# Patient Record
Sex: MALE | Race: WHITE | Employment: OTHER | URBAN - METROPOLITAN AREA
[De-identification: names, ages, dates, MRNs, and addresses within clinical notes are randomized per-mention and may not be internally consistent; named-entity substitution may affect disease eponyms.]

---

## 2018-02-07 ENCOUNTER — HOSPITAL ENCOUNTER (OUTPATIENT)
Dept: PHYSICAL THERAPY | Age: 68
Discharge: HOME | End: 2018-02-28
Attending: GENERAL PRACTICE | Admitting: GENERAL PRACTICE

## 2018-02-07 ASSESSMENT — PAIN DESCRIPTION - ORIENTATION: ORIENTATION: MID;LEFT;RIGHT

## 2018-02-07 ASSESSMENT — PAIN SCALES - GENERAL: PAINLEVEL_OUTOF10: 5

## 2018-02-07 ASSESSMENT — PAIN DESCRIPTION - PROGRESSION: CLINICAL_PROGRESSION: GRADUALLY WORSENING

## 2018-02-07 ASSESSMENT — PAIN DESCRIPTION - ONSET: ONSET: ON-GOING

## 2018-02-07 ASSESSMENT — PAIN DESCRIPTION - PAIN TYPE: TYPE: CHRONIC PAIN

## 2018-02-07 ASSESSMENT — PAIN DESCRIPTION - FREQUENCY: FREQUENCY: CONTINUOUS

## 2018-02-07 ASSESSMENT — PAIN DESCRIPTION - LOCATION: LOCATION: NECK

## 2018-02-07 ASSESSMENT — PAIN DESCRIPTION - DESCRIPTORS: DESCRIPTORS: ACHING;DULL

## 2018-02-07 NOTE — PLAN OF CARE
1 day # Weeks: [] 1 week [] 5 weeks     [x] 2 days?    [] 2 weeks [x] 6 weeks     [] 3 days   [] 3 weeks [] 7 weeks     [] 4 days   [] 4 weeks [] 8 weeks         [] 9 weeks [] 10 weeks         [] 11 weeks [] 12 weeks    Rehab Potential/Progress: [] Excellent [x] Good [] Fair  [] Poor     Goals:      Long term goals  Time Frame for Long term goals : 6 Weeks  Long term goal 1: Pt will improve lateral flexion to 35° bilaterall  Long term goal 2: Pt will improve cervical rotations to 60° bilaterally  Long term goal 3: Pt will have 50% reduction in pain  Long term goal 4: Pt will have improve ability to drive  Long term goal 5: Pt will improve NDI to <25%    G-Code Selection: (On Eval and every 10th visit or Discharge)  MEASURE  [] Mobility: Walking and Moving Around     [] Current ()   [] Goal ()   [] DC ()  [x] Changing/Maintaining Body Position     [x] Current (8981)      [x] Goal ()   [] DC ()  [] Carrying / Moving / Handling Objects     [] Current ()   [] Goal ()   [] DC ()  [] Self-Care     [] Current ()   [] Goal ()   [] DC ()  [] Other PT/OT primary DX     [] Current ()   [] Goal ()   [] DC ()    SEVERITY  CURRENT  GOAL  DISCHARGE   [] CH (0% Impaired, Indep.)  [] CI (1-19% Impaired, SBA-CGA)  [] CJ (20-39% Impaired, MIN A)  [x] CK  (40-59% Impairment, Mod A)  [] CL  (60-79% Impairment, Max A)  [] CM  (80-99% Impairment, Dep.)   [] CN  (100% Impairment, Tot Dep.) [] CH (0% Impaired, Indep.)  [x] CI (1-19% Impaired, SBA-CGA)  [] CJ (20-39% Impaired, MIN A)  [] CK  (40-59% Impairment, Mod A)  [] CL  (60-79% Impairment, Max A)  [] CM  (80-99% Impairment, Dep.)   [] CN  (100% Impairment, Tot Dep.)  [] CH (0% Impaired, Indep.)  [] CI (1-19% Impaired, SBA-CGA)  [] CJ (20-39% Impaired, MIN A)  [] CK  (40-59% Impairment, Mod A)  [] CL  (60-79% Impairment, Max A)  [] CM  (80-99% Impairment, Dep.)   [] CN  (100% Impairment, Tot Dep.)

## 2018-02-07 NOTE — FLOWSHEET NOTE
Lumbar Traction  [x] Neuromuscular Re-education    [x] Cold/hotpack [] Iontophoresis   [x] Instruction in HEP      [] Vasopneumatic     [x] Manual Therapy               [] Aquatic Therapy     Manual Treatments:  Manual traction, suboccipital release, PROM in rotations and lateral flexion bilaterally- grade II side glides with lateral flexion, STM to right UT    Modalities: MHP in sitting 5 layers 10'     Communication with other providers:  poc sent to referring provider    Education provided to patient/caregiver:  Pt educated on poc, hep, pathology, if worsening symptoms to d/c that exercise. Adverse reactions to treatment:  None    Equipment provided: GTB     Assessment:  Cabrera Mann" is a 69 y.o. male reporting to OP PT with c/c of neck pain which has been occuring for years and over past few months has increased. Pt is noted to have reduced global cervical ROM, with muscular tightness in posterior muscles. Strength is WNL and there is no red flags. Pt can benefit from PT addressing deficits to help improve pain and functinal mobility. Patient agrees with established plan of care and assisted in the development of their short term and long term goals. Patient had no adverse reaction with initial treatment and there are no barriers to learning.  Demonstrates no mental or cognitive disorder.       Time In / Time Out:  1300/1400                     Timed Code/Total Treatment Minutes:  25/60; 25 Eval (1),15 Man (1), 10 TA (1)    Patients Report of Tolerance:    [] Patient limited by fatigue        [] Patient limited by pain   [] Patient limited by other medical complications   [x] Other: david tx well     Prognosis:   [x] Good [] Fair  [] Poor    Plan:   [x] Continue per plan of care [] Alter current plan (see comments)  [] Plan of care initiated [] Hold pending MD visit [] Discharge    Plan for Next Session:        Electronically signed by:  Renata Corea, PT  2/7/2018, 3:02 PM

## 2018-02-07 NOTE — PROGRESS NOTES
Physical Therapy  Initial Assessment  Date: 2018  Patient Name: Nathan Dinero  MRN: [id number]  : 1950     Treatment Diagnosis: Decreased ROM     Restrictions  Position Activity Restriction  Other position/activity restrictions: None    Subjective   General  Chart Reviewed: Yes  Patient assessed for rehabilitation services?: Yes  Family / Caregiver Present: No  Referring Practitioner: Fariha Ochoa  Referral Date : 18  Diagnosis: Neck pain  Follows Commands: Within Functional Limits  General Comment  Comments: No leg symptoms,   PT Visit Information  Onset Date: 18  PT Insurance Information: Medicare  Subjective  Subjective: Pt states he has had neck pain for years. Over last couple of months pain has increased. Having difficulty turing head and also getting headaches. Also stated having some right shoulder painalong with the neck pain. Driving makes it worse. Pain Screening  Patient Currently in Pain: Yes  Pain Assessment  Pain Assessment: 0-10  Pain Level: 5 (Max 8/10)  Pain Type: Chronic pain  Pain Location: Neck  Pain Orientation: Mid;Left;Right  Pain Radiating Towards: None  Pain Descriptors: Aching;Dull  Pain Frequency: Continuous  Pain Onset: On-going  Clinical Progression: Gradually worsening  Effect of Pain on Daily Activities: Pt having difficulty driving,   Patient's Stated Pain Goal: No pain  Pain Intervention(s): Medication (see eMar); Heat applied;Cold applied  Vital Signs  Patient Currently in Pain: Yes      Orientation  Orientation  Overall Orientation Status: Within Normal Limits    Objective     Observation/Palpation  Posture: Fair  Palpation: ttp cervical paraspinals, taught paraspinals, suboccipitals and upper traps  Scar: Midline incision from previous surgery.      AROM RLE (degrees)  RLE AROM: WNL  AROM LLE (degrees)  LLE AROM : WNL  Spine  Cervical: Flexion 40, extension 25, RLF 24, LLF 20, ROTR 30, ROTL 45    Strength RUE  R Shoulder Flexion: 5/5  R Shoulder

## 2018-02-12 ENCOUNTER — HOSPITAL ENCOUNTER (OUTPATIENT)
Dept: PHYSICAL THERAPY | Age: 68
Discharge: HOME | End: 2018-02-12
Admitting: GENERAL PRACTICE

## 2018-03-01 ENCOUNTER — HOSPITAL ENCOUNTER (OUTPATIENT)
Dept: OTHER | Age: 68
Discharge: HOME | End: 2018-03-27
Attending: GENERAL PRACTICE | Admitting: GENERAL PRACTICE

## 2020-03-19 ENCOUNTER — HOSPITAL ENCOUNTER (OUTPATIENT)
Dept: ULTRASOUND IMAGING | Age: 70
Discharge: HOME | End: 2020-03-19
Payer: MEDICARE

## 2020-03-19 PROCEDURE — 93971 EXTREMITY STUDY: CPT

## 2020-04-29 ENCOUNTER — HOSPITAL ENCOUNTER (OUTPATIENT)
Dept: ULTRASOUND IMAGING | Age: 70
Discharge: HOME | End: 2020-04-29
Payer: MEDICARE

## 2020-04-29 PROCEDURE — 93971 EXTREMITY STUDY: CPT

## 2021-01-05 ENCOUNTER — ANESTHESIA EVENT (OUTPATIENT)
Dept: OPERATING ROOM | Age: 71
End: 2021-01-05
Payer: MEDICARE

## 2021-01-05 RX ORDER — CEVIMELINE HYDROCHLORIDE 30 MG/1
30 CAPSULE ORAL 3 TIMES DAILY
COMMUNITY

## 2021-01-05 RX ORDER — METOPROLOL SUCCINATE 50 MG/1
50 TABLET, EXTENDED RELEASE ORAL DAILY
COMMUNITY

## 2021-01-05 RX ORDER — ASPIRIN 81 MG/1
81 TABLET ORAL NIGHTLY
COMMUNITY

## 2021-01-05 RX ORDER — LOVASTATIN 40 MG/1
40 TABLET ORAL NIGHTLY
COMMUNITY

## 2021-01-05 NOTE — PROGRESS NOTES
Patient states had his covid done at [address] on 12/30/2020- \"you need to call Dr Jim Gibson office and have him fax you the results to you\"- office called and they are to fax over the results

## 2021-01-06 NOTE — PROGRESS NOTES
Called to let the patient know his procedure time changed to 65 with arrival at 0930- states they called me and told me was 1045 but be there at 0945- explained to pt they have pts come one hour and 15 minutes early so needs to arriva at 0930- verbalized understanding and did inform the patient that I faxed to [facility] the patients covid results done at [facility]

## 2021-01-07 ENCOUNTER — HOSPITAL ENCOUNTER (OUTPATIENT)
Age: 71
Setting detail: OUTPATIENT SURGERY
Discharge: HOME | End: 2021-01-07
Attending: SPECIALIST | Admitting: SPECIALIST
Payer: MEDICARE

## 2021-01-07 ENCOUNTER — ANESTHESIA (OUTPATIENT)
Dept: OPERATING ROOM | Age: 71
End: 2021-01-07
Payer: MEDICARE

## 2021-01-07 VITALS
TEMPERATURE: 97.8 F | SYSTOLIC BLOOD PRESSURE: 142 MMHG | BODY MASS INDEX: 24.62 KG/M2 | DIASTOLIC BLOOD PRESSURE: 75 MMHG | RESPIRATION RATE: 16 BRPM | HEIGHT: 70 IN | OXYGEN SATURATION: 99 % | HEART RATE: 63 BPM | WEIGHT: 172 LBS

## 2021-01-07 VITALS — DIASTOLIC BLOOD PRESSURE: 59 MMHG | OXYGEN SATURATION: 100 % | SYSTOLIC BLOOD PRESSURE: 96 MMHG

## 2021-01-07 PROCEDURE — 7100000010 HC PHASE II RECOVERY - FIRST 15 MIN: Performed by: SPECIALIST

## 2021-01-07 PROCEDURE — 2709999900 HC NON-CHARGEABLE SUPPLY: Performed by: SPECIALIST

## 2021-01-07 PROCEDURE — 6360000002 HC RX W HCPCS: Performed by: NURSE ANESTHETIST, CERTIFIED REGISTERED

## 2021-01-07 PROCEDURE — 88305 TISSUE EXAM BY PATHOLOGIST: CPT

## 2021-01-07 PROCEDURE — 3700000001 HC ADD 15 MINUTES (ANESTHESIA): Performed by: SPECIALIST

## 2021-01-07 PROCEDURE — 3700000000 HC ANESTHESIA ATTENDED CARE: Performed by: SPECIALIST

## 2021-01-07 PROCEDURE — 3609010600 HC COLONOSCOPY POLYPECTOMY SNARE/COLD BIOPSY: Performed by: SPECIALIST

## 2021-01-07 PROCEDURE — 7100000011 HC PHASE II RECOVERY - ADDTL 15 MIN: Performed by: SPECIALIST

## 2021-01-07 PROCEDURE — 2580000003 HC RX 258: Performed by: SPECIALIST

## 2021-01-07 RX ORDER — LIDOCAINE HYDROCHLORIDE 20 MG/ML
INJECTION, SOLUTION INTRAVENOUS PRN
Status: DISCONTINUED | OUTPATIENT
Start: 2021-01-07 | End: 2021-01-07 | Stop reason: SDUPTHER

## 2021-01-07 RX ORDER — PROPOFOL 10 MG/ML
INJECTION, EMULSION INTRAVENOUS PRN
Status: DISCONTINUED | OUTPATIENT
Start: 2021-01-07 | End: 2021-01-07 | Stop reason: SDUPTHER

## 2021-01-07 RX ORDER — SODIUM CHLORIDE, SODIUM LACTATE, POTASSIUM CHLORIDE, CALCIUM CHLORIDE 600; 310; 30; 20 MG/100ML; MG/100ML; MG/100ML; MG/100ML
INJECTION, SOLUTION INTRAVENOUS CONTINUOUS
Status: DISCONTINUED | OUTPATIENT
Start: 2021-01-07 | End: 2021-01-07 | Stop reason: HOSPADM

## 2021-01-07 RX ADMIN — PROPOFOL 320 MG: 10 INJECTION, EMULSION INTRAVENOUS at 11:01

## 2021-01-07 RX ADMIN — SODIUM CHLORIDE, POTASSIUM CHLORIDE, SODIUM LACTATE AND CALCIUM CHLORIDE: 600; 310; 30; 20 INJECTION, SOLUTION INTRAVENOUS at 10:07

## 2021-01-07 RX ADMIN — LIDOCAINE HYDROCHLORIDE 100 MG: 20 INJECTION, SOLUTION INTRAVENOUS at 11:01

## 2021-01-07 ASSESSMENT — PAIN SCALES - GENERAL: PAINLEVEL_OUTOF10: 0

## 2021-01-07 ASSESSMENT — PAIN - FUNCTIONAL ASSESSMENT: PAIN_FUNCTIONAL_ASSESSMENT: 0-10

## 2021-01-07 ASSESSMENT — LIFESTYLE VARIABLES: SMOKING_STATUS: 0

## 2021-01-07 NOTE — PROGRESS NOTES
1136 Patient transferred from GI lab to [facility] via cart, he is awake and denies needs at this time. IV discontinued it was out on arrival from procedure. 1140 Patient is sitting up drinking a pepsi. [address] with patient's wife Edd Brewster and gave her report and home instructions, she verbalized understanding. 1220 Patient is waiting on Dr Rishabh Huang to come out and talk to him.

## 2021-01-07 NOTE — ANESTHESIA PRE PROCEDURE
Department of Anesthesiology  Preprocedure Note       Name:  Jeffry Beltre   Age:  65 y.o.  :  1950                                          MRN:  [id number]         Date:  2021      Surgeon: Bri Suarez):  Cornell Hummel MD    Procedure: Procedure(s):  COLORECTAL CANCER SCREENING, NOT HIGH RISK    Medications prior to admission:   Prior to Admission medications    Medication Sig Start Date End Date Taking? Authorizing Provider   metoprolol succinate (TOPROL XL) 50 MG extended release tablet Take 50 mg by mouth daily   Yes Historical Provider, MD   cevimeline (EVOXAC) 30 MG capsule Take 30 mg by mouth 3 times daily   Yes Historical Provider, MD   aspirin 81 MG EC tablet Take 81 mg by mouth nightly   Yes Historical Provider, MD   lovastatin (MEVACOR) 40 MG tablet Take 40 mg by mouth nightly   Yes Historical Provider, MD   rivaroxaban (XARELTO) 10 MG TABS tablet Take 5 mg by mouth daily    Historical Provider, MD   celecoxib (CELEBREX) 200 MG capsule Take 200 mg by mouth daily     Historical Provider, MD   LISINOPRIL PO Take 10 mg by mouth daily     Historical Provider, MD       Current medications:    No current facility-administered medications for this encounter.       Current Outpatient Medications   Medication Sig Dispense Refill    metoprolol succinate (TOPROL XL) 50 MG extended release tablet Take 50 mg by mouth daily      cevimeline (EVOXAC) 30 MG capsule Take 30 mg by mouth 3 times daily      aspirin 81 MG EC tablet Take 81 mg by mouth nightly      lovastatin (MEVACOR) 40 MG tablet Take 40 mg by mouth nightly      rivaroxaban (XARELTO) 10 MG TABS tablet Take 5 mg by mouth daily      celecoxib (CELEBREX) 200 MG capsule Take 200 mg by mouth daily       LISINOPRIL PO Take 10 mg by mouth daily          Allergies:  No Known Allergies    Problem List:    Patient Active Problem List   Diagnosis Code    Anemia D64.9    Hypercholesteremia E78.00       Past Medical History:        Diagnosis Date  Anemia     11/10 EGD WNL    Arthritis     \"hands, foot, shoulder , hip\"    Cancer ([address]     skin cancer removed from back x 3    Fatigue     Heart murmur     Hx of blood clots     \"had blood clot right leg March 2020- started on blood thinner then\"    Hx of colonoscopy     11/10 C-scope WNL    Hypercholesteremia     Hypertension     follow with Dr Fonseca [place] [address]    Low back pain     8/3 MRI mild stenosis L3-4, sm herniation L5-S1    Palpitations     10/13 event monitor - symptomatic PVCs and PACs; 9/13 5 beat atrial run    Screening for prostate cancer     Wears glasses        Past Surgical History:        Procedure Laterality Date    BACK SURGERY      per old chart hx of cervical discectomy 1991\"no metal\"    SKIN CANCER EXCISION  10/10, 1/14, 2020    Basal cell    TONSILLECTOMY  age 4   Codi Salines TOTAL HIP ARTHROPLASTY Left 02/12/2010    Janel       Social History:    Social History     Tobacco Use    Smoking status: Never Smoker    Smokeless tobacco: Never Used   Substance Use Topics    Alcohol use: No                                Counseling given: Not Answered      Vital Signs (Current):   Vitals:    01/05/21 0909   Weight: 172 lb (78 kg)   Height: 5' 10\" (1.778 m)                                              BP Readings from Last 3 Encounters:   07/18/13 130/72   07/19/12 124/80       NPO Status:                                                                                 BMI:   Wt Readings from Last 3 Encounters:   01/05/21 172 lb (78 kg)   01/04/21 182 lb (82.6 kg)   07/18/13 171 lb 9.6 oz (77.8 kg)     Body mass index is 24.68 kg/m².     CBC:   Lab Results   Component Value Date    WBC 5.4 07/18/2013    RBC 4.40 07/18/2013    HGB 13.1 07/18/2013    HCT 40.1 07/18/2013    MCV 91.0 07/18/2013    RDW 14.6 07/18/2013     07/18/2013       CMP:   Lab Results   Component Value Date     07/18/2013    K 5.5 07/18/2013     07/18/2013    CO2 28 07/18/2013    BUN 20 07/18/2013 CREATININE 1.0 07/18/2013    GFRAA >60 07/18/2013    GFRAA >60 07/19/2012    AGRATIO 1.6 07/18/2013    LABGLOM >60 07/18/2013    GLUCOSE 98 07/18/2013    PROT 7.0 07/18/2013    PROT 7.0 07/19/2012    CALCIUM 9.7 07/18/2013    BILITOT 0.30 07/18/2013    ALKPHOS 46 07/18/2013    AST 24 07/18/2013    ALT 18 07/18/2013       POC Tests: No results for input(s): POCGLU, POCNA, POCK, POCCL, POCBUN, POCHEMO, POCHCT in the last 72 hours. Coags: No results found for: PROTIME, INR, APTT    HCG (If Applicable): No results found for: PREGTESTUR, PREGSERUM, HCG, HCGQUANT     ABGs: No results found for: PHART, PO2ART, VNX1CMU, ZWN1XPD, BEART, W5SUTMZG     Type & Screen (If Applicable):  No results found for: LABABO, LABRH    Drug/Infectious Status (If Applicable):  No results found for: HIV, HEPCAB    COVID-19 Screening (If Applicable): No results found for: COVID19      Anesthesia Evaluation  Patient summary reviewed and Nursing notes reviewed no history of anesthetic complications:   Airway: Mallampati: II  TM distance: >3 FB   Neck ROM: full  Mouth opening: > = 3 FB Dental:          Pulmonary:       (-) not a current smoker                           Cardiovascular:    (+) hypertension:,          Beta Blocker:  Dose within 24 Hrs         Neuro/Psych:               GI/Hepatic/Renal:   (+) bowel prep,           Endo/Other:    (+) blood dyscrasia: anticoagulation therapy and anemia:., .                 Abdominal:           Vascular:   + DVT, . Anesthesia Plan      MAC     ASA 3       Induction: intravenous. Anesthetic plan and risks discussed with patient.                     Meena Liu, APRN - CRNA   1/7/2021

## 2021-01-07 NOTE — ANESTHESIA POSTPROCEDURE EVALUATION
Department of Anesthesiology  Postprocedure Note    Patient: Jerri Hoover  MRN: [id number]  YOB: 1950  Date of evaluation: 1/7/2021  Time:  11:34 AM     Procedure Summary     Date: 01/07/21 Room / Location: [address]    Anesthesia Start: 3559 Anesthesia Stop: 0482    Procedure: COLONOSCOPY POLYPECTOMY SNARE/COLD BIOPSY (N/A ) Diagnosis: (ROUTINE)    Surgeons: Camilla Enamorado MD Responsible Provider: RO Ferrera CRNA    Anesthesia Type: MAC ASA Status: 3          Anesthesia Type: MAC    Urvashi Phase I:      Urvashi Phase II:      Last vitals: Reviewed and per EMR flowsheets.        Anesthesia Post Evaluation    Patient location during evaluation: bedside  Patient participation: complete - patient participated  Level of consciousness: awake and alert  Pain score: 0  Airway patency: patent  Nausea & Vomiting: no nausea and no vomiting  Complications: no  Cardiovascular status: blood pressure returned to baseline  Respiratory status: acceptable, nonlabored ventilation, spontaneous ventilation and room air  Hydration status: euvolemic

## 2021-01-07 NOTE — BRIEF OP NOTE
BRIEF COLONOSCOPY REPORT:   Photos and full colonoscopy report available by going to \"chart review\" then \"procedures\" then  \"colonoscopy\" then \"View Endoscopy Report\"      IMPRESSION :   1) 6 mm polyp removed from the lower ascending colon with the cold snare  2) two 3 mm polyps removed from the splenic flexure with the cold forceps  3) scattered pan-diverticulosis noted  4) small internal hemorrhoids  5) otherwise normal colon    PLAN : follow up colonoscopy in 7-10 years

## 2024-11-04 ENCOUNTER — TELEPHONE (OUTPATIENT)
Dept: PULMONOLOGY | Age: 74
End: 2024-11-04

## 2024-11-13 ENCOUNTER — OFFICE VISIT (OUTPATIENT)
Dept: PULMONOLOGY | Age: 74
End: 2024-11-13
Payer: MEDICARE

## 2024-11-13 VITALS
SYSTOLIC BLOOD PRESSURE: 140 MMHG | RESPIRATION RATE: 17 BRPM | BODY MASS INDEX: 25.74 KG/M2 | WEIGHT: 179.8 LBS | DIASTOLIC BLOOD PRESSURE: 64 MMHG | OXYGEN SATURATION: 98 % | HEIGHT: 70 IN | HEART RATE: 70 BPM

## 2024-11-13 DIAGNOSIS — G47.33 OSA (OBSTRUCTIVE SLEEP APNEA): Primary | ICD-10-CM

## 2024-11-13 DIAGNOSIS — E66.3 OVERWEIGHT (BMI 25.0-29.9): ICD-10-CM

## 2024-11-13 DIAGNOSIS — G47.10 HYPERSOMNIA: ICD-10-CM

## 2024-11-13 PROCEDURE — 99204 OFFICE O/P NEW MOD 45 MIN: CPT | Performed by: INTERNAL MEDICINE

## 2024-11-13 PROCEDURE — G8419 CALC BMI OUT NRM PARAM NOF/U: HCPCS | Performed by: INTERNAL MEDICINE

## 2024-11-13 PROCEDURE — 1159F MED LIST DOCD IN RCRD: CPT | Performed by: INTERNAL MEDICINE

## 2024-11-13 PROCEDURE — 1036F TOBACCO NON-USER: CPT | Performed by: INTERNAL MEDICINE

## 2024-11-13 PROCEDURE — G8484 FLU IMMUNIZE NO ADMIN: HCPCS | Performed by: INTERNAL MEDICINE

## 2024-11-13 PROCEDURE — 1123F ACP DISCUSS/DSCN MKR DOCD: CPT | Performed by: INTERNAL MEDICINE

## 2024-11-13 PROCEDURE — G8427 DOCREV CUR MEDS BY ELIG CLIN: HCPCS | Performed by: INTERNAL MEDICINE

## 2024-11-13 RX ORDER — ROSUVASTATIN CALCIUM 10 MG/1
10 TABLET, COATED ORAL DAILY
COMMUNITY

## 2024-11-13 RX ORDER — PREDNISONE 5 MG/1
5 TABLET ORAL DAILY
COMMUNITY

## 2024-11-13 ASSESSMENT — SLEEP AND FATIGUE QUESTIONNAIRES
HOW LIKELY ARE YOU TO NOD OFF OR FALL ASLEEP WHILE SITTING QUIETLY AFTER LUNCH WITHOUT ALCOHOL: WOULD NEVER DOZE
ESS TOTAL SCORE: 4
HOW LIKELY ARE YOU TO NOD OFF OR FALL ASLEEP WHILE SITTING INACTIVE IN A PUBLIC PLACE: WOULD NEVER DOZE
HOW LIKELY ARE YOU TO NOD OFF OR FALL ASLEEP WHILE LYING DOWN TO REST IN THE AFTERNOON WHEN CIRCUMSTANCES PERMIT: MODERATE CHANCE OF DOZING
HOW LIKELY ARE YOU TO NOD OFF OR FALL ASLEEP WHEN YOU ARE A PASSENGER IN A CAR FOR AN HOUR WITHOUT A BREAK: WOULD NEVER DOZE
HOW LIKELY ARE YOU TO NOD OFF OR FALL ASLEEP WHILE SITTING AND TALKING TO SOMEONE: WOULD NEVER DOZE
HOW LIKELY ARE YOU TO NOD OFF OR FALL ASLEEP WHILE SITTING AND READING: SLIGHT CHANCE OF DOZING
HOW LIKELY ARE YOU TO NOD OFF OR FALL ASLEEP WHILE WATCHING TV: SLIGHT CHANCE OF DOZING
HOW LIKELY ARE YOU TO NOD OFF OR FALL ASLEEP IN A CAR, WHILE STOPPED FOR A FEW MINUTES IN TRAFFIC: WOULD NEVER DOZE

## 2024-11-13 NOTE — PROGRESS NOTES
[facility]       Arjun Cheema MD, [facility]      [address]  [address]  [place] [address]   PH: [phone]  F: [phone]     Subjective:     Patient ID: Fidencio Jose is a 74 y.o. male, referred to the sleep center for   Chief Complaint   Patient presents with    Establish Care     Trouble sleeping/snoring periodically- heart MD recommends a sleep study.  C/O dry mouth.   .    Referring physician:  Brayan Mendez MDRef Provider      History:has been referred for the VINCE    Symptoms:   [x]  Snoring                                                                    [x]  Dry Mouth  []  Choking                                                                   []  Morning Headaches  []  Gasping for Air                                                        []  Trouble Falling asleep  [x]  Tired during the daytime                                         []  Trouble Staying Asleep  []  Tired when you wake up                                         []  Weight Gain in Last 5 Years  [x]  Wake up frequently at night                                    []  Weight Loss in Last 5 Years  []  Shortness Of Breath                                               []  Shift Worker  []  Coughing                                                                []  Smoker (Previous or Current)  []  Chest Pain                                                              []  Anxiety  []  Trouble keeping your legs still at night                   []  Depression  []  Kicking your legs in your sleep                               []  Insomnia     [] Palpitation  []   Stop breathing      []  Other:     Significant Co-morbidities:  []  Congestive Heart Failure     []  COPD         []  Stroke (Past 30 Days)      []  Supplemental Oxygen Usage       []  Cognitive Impairment      []  Neuromuscular Problems  []  Epilepsy/Neurological Disorders           Social History     Socioeconomic History

## 2024-12-05 ENCOUNTER — HOSPITAL ENCOUNTER (OUTPATIENT)
Dept: SLEEP CENTER | Age: 74
Discharge: HOME | End: 2024-12-05
Attending: INTERNAL MEDICINE
Payer: MEDICARE

## 2024-12-05 DIAGNOSIS — G47.33 OSA (OBSTRUCTIVE SLEEP APNEA): ICD-10-CM

## 2024-12-05 PROCEDURE — G0399 HOME SLEEP TEST/TYPE 3 PORTA: HCPCS

## 2024-12-05 NOTE — PROGRESS NOTES
Fidencio LOAN Jose  1950  arrived at Sleep Center on 12/5/2024 for set up and instruction of home sleep study with the [facility] unit.  he was instructed on proper set-up and operation of HST unit. Written instructions with set up diagram given for reference and reinforcement of verbal instruction and demonstration. he was able to return demonstration appropriately. No home environment, vision, dexterity, comprehension concerns with this patient based on completed forms and patient interactions. Patient will return unit after one night as instructed.    Electronically signed by Nighat Jordan on 12/5/2024 at 2:02 PM

## 2024-12-14 NOTE — PROGRESS NOTES
pleasant 74-year-old male, a very good historian presenting with dry mouth worse at night and early morning.  We will evaluate for Sjogren's.    1. Dry mouth  - Quantiferon, Incubated; Future  - Uric Acid; Future  - Rheumatoid Factor; Future  - Hepatitis Panel, Acute; Future  - Cyclic Citrul Peptide Antibody, IgG; Future  - Vitamin D 25 Hydroxy; Future  - DEXA BONE DENSITY AXIAL SKELETON; Future  - Hepatic Function Panel; Future  - Sjogrens syndrome-A extractable nuclear antibody; Future  - Anti SSB; Future    2. Encounter for other specified special examinations  - Hepatitis Panel, Acute; Future    3. Disorder of bone, unspecified  - Vitamin D 25 Hydroxy; Future    4. Other specified disorders of bone density and structure, multiple sites  - DEXA BONE DENSITY AXIAL SKELETON; Future       Patient Instructions  Complete ordered labs  Schedule for bone density test  We will discuss results at next visit  RTC in 6 weeks      -  The patient indicates understanding of these issues and agrees with the plan.    I spent  60 minutes on the date of service, preparing to see the patient (eg, review of tests), obtaining and/or reviewing separately obtained history, counseling, ordering medications, tests, or procedures, documenting clinical information in the electronic or other health record and in care coordination.This note was dictated with voice recognition software        January Cadet MD

## 2024-12-15 ASSESSMENT — RHEUMATOLOGY NEW PATIENT QUESTIONNAIRE
COUGH: Y
UNUSUALLY RAPID OR SLOWED HEART RATE: N
JAUNDICE: N
HEARTBURN OR REFLUX: N
EXCESSIVE HAIR LOSS (MORE THAN YOUR NORM): N
CHEST PAIN: N
DIFFICULTY SWALLOWING: Y
VOMITING OF BLOOD OR COFFEE GROUND CONSISTENCY MATERIAL: N
LOSS OF CONSCIOUSNESS: N
HOARSE VOICE: Y
AGITATION: N
LOSS OF VISION: Y
SWOLLEN LEGS OR FEET: N
DIFFICULTY FALLING ASLEEP: N
BEHAVIORAL CHANGES: N
EASY BRUISING: Y
FAINTING: N
UNEXPLAINED WEIGHT CHANGE: N
SKIN REDNESS: N
DOUBLE OR BLURRED VISION: Y
STOMACH PAIN: N
ANEMIA: Y
JOINT PAIN: N
RASH: N
ANXIETY: N
HEADACHES: N
NUMBNESS OR TINGLING IN HANDS OR FEET: N
NODULES/BUMPS: N
EYE DRYNESS: Y
DRYNESS OF MOUTH: Y
DEPRESSION: N
SEIZURES: N
COLOR CHANGES OF HANDS OR FEET IN THE COLD: N
EASILY LOSING TEMPER: N
SUN SENSITIVE (SUN ALLERGY): N
DIFFICULTY BREATHING LYING DOWN: N
NIGHT SWEATS: N
DIFFICULTY STAYING ASLEEP: Y
BLOOD IN STOOLS: N
WHEEZING: N
SKIN TIGHTNESS: N
SHORTNESS OF BREATH: N
UNUSUAL BLEEDING: N
ABNORMAL URINE: N
NAUSEA: N
PAIN OR BURNING ON URINATION: N
MEMORY LOSS: N
COUGHING OF BLOOD: N
UNEXPLAINED HEARING LOSS: N
SORES IN MOUTH OR NOSE: N
INCREASED SUSCEPTIBILITY TO INFECTION: N
SWOLLEN OR TENDER GLANDS: N
JOINT SWELLING: N
MORNING STIFFNESS: N
PERSISTENT DIARRHEA: N
RASH OR ULCERS: N
UNUSUAL FATIGUE: N
FEVER: N
BLACK STOOLS: N
MUSCLE WEAKNESS: N

## 2024-12-18 ENCOUNTER — HOSPITAL ENCOUNTER (OUTPATIENT)
Age: 74
Discharge: HOME | End: 2024-12-18
Payer: MEDICARE

## 2024-12-18 ENCOUNTER — OFFICE VISIT (OUTPATIENT)
Age: 74
End: 2024-12-18
Payer: MEDICARE

## 2024-12-18 VITALS
WEIGHT: 180.4 LBS | SYSTOLIC BLOOD PRESSURE: 140 MMHG | OXYGEN SATURATION: 98 % | BODY MASS INDEX: 25.88 KG/M2 | DIASTOLIC BLOOD PRESSURE: 72 MMHG | HEART RATE: 57 BPM

## 2024-12-18 DIAGNOSIS — M89.9 DISORDER OF BONE, UNSPECIFIED: ICD-10-CM

## 2024-12-18 DIAGNOSIS — R68.2 DRY MOUTH: Primary | ICD-10-CM

## 2024-12-18 DIAGNOSIS — Z01.89 ENCOUNTER FOR OTHER SPECIFIED SPECIAL EXAMINATIONS: ICD-10-CM

## 2024-12-18 DIAGNOSIS — M85.89 OTHER SPECIFIED DISORDERS OF BONE DENSITY AND STRUCTURE, MULTIPLE SITES: ICD-10-CM

## 2024-12-18 DIAGNOSIS — R68.2 DRY MOUTH: ICD-10-CM

## 2024-12-18 LAB
25(OH)D3 SERPL-MCNC: 28.9 NG/ML (ref 30–150)
ALBUMIN SERPL-MCNC: 3.9 G/DL (ref 3.4–5)
ALBUMIN/GLOB SERPL: 1.5 {RATIO} (ref 1.1–2.2)
ALP SERPL-CCNC: 49 U/L (ref 40–129)
ALT SERPL-CCNC: 15 U/L (ref 10–40)
AST SERPL-CCNC: 23 U/L (ref 15–37)
BILIRUB DIRECT SERPL-MCNC: <0.2 MG/DL (ref 0–0.3)
BILIRUB INDIRECT SERPL-MCNC: NORMAL MG/DL (ref 0–0.7)
BILIRUB SERPL-MCNC: 0.3 MG/DL (ref 0–1)
HAV IGM SERPL QL IA: NONREACTIVE
HBV CORE IGM SERPL QL IA: NONREACTIVE
HBV SURFACE AG SERPL QL IA: NONREACTIVE
HCV AB SERPL QL IA: NONREACTIVE
PROT SERPL-MCNC: 6.4 G/DL (ref 6.4–8.2)
URATE SERPL-MCNC: 5 MG/DL (ref 3.5–7.2)

## 2024-12-18 PROCEDURE — 86431 RHEUMATOID FACTOR QUANT: CPT

## 2024-12-18 PROCEDURE — G8419 CALC BMI OUT NRM PARAM NOF/U: HCPCS | Performed by: STUDENT IN AN ORGANIZED HEALTH CARE EDUCATION/TRAINING PROGRAM

## 2024-12-18 PROCEDURE — 82306 VITAMIN D 25 HYDROXY: CPT

## 2024-12-18 PROCEDURE — 1036F TOBACCO NON-USER: CPT | Performed by: STUDENT IN AN ORGANIZED HEALTH CARE EDUCATION/TRAINING PROGRAM

## 2024-12-18 PROCEDURE — 84550 ASSAY OF BLOOD/URIC ACID: CPT

## 2024-12-18 PROCEDURE — 86200 CCP ANTIBODY: CPT

## 2024-12-18 PROCEDURE — G8484 FLU IMMUNIZE NO ADMIN: HCPCS | Performed by: STUDENT IN AN ORGANIZED HEALTH CARE EDUCATION/TRAINING PROGRAM

## 2024-12-18 PROCEDURE — G8427 DOCREV CUR MEDS BY ELIG CLIN: HCPCS | Performed by: STUDENT IN AN ORGANIZED HEALTH CARE EDUCATION/TRAINING PROGRAM

## 2024-12-18 PROCEDURE — 86235 NUCLEAR ANTIGEN ANTIBODY: CPT

## 2024-12-18 PROCEDURE — 99204 OFFICE O/P NEW MOD 45 MIN: CPT | Performed by: STUDENT IN AN ORGANIZED HEALTH CARE EDUCATION/TRAINING PROGRAM

## 2024-12-18 PROCEDURE — 80076 HEPATIC FUNCTION PANEL: CPT

## 2024-12-18 PROCEDURE — 86480 TB TEST CELL IMMUN MEASURE: CPT

## 2024-12-18 PROCEDURE — 1123F ACP DISCUSS/DSCN MKR DOCD: CPT | Performed by: STUDENT IN AN ORGANIZED HEALTH CARE EDUCATION/TRAINING PROGRAM

## 2024-12-18 PROCEDURE — 1159F MED LIST DOCD IN RCRD: CPT | Performed by: STUDENT IN AN ORGANIZED HEALTH CARE EDUCATION/TRAINING PROGRAM

## 2024-12-18 PROCEDURE — 80074 ACUTE HEPATITIS PANEL: CPT

## 2024-12-18 PROCEDURE — PBSHW PBB SHADOW CHARGE: Performed by: STUDENT IN AN ORGANIZED HEALTH CARE EDUCATION/TRAINING PROGRAM

## 2024-12-18 PROCEDURE — 36415 COLL VENOUS BLD VENIPUNCTURE: CPT

## 2024-12-18 NOTE — PATIENT INSTRUCTIONS
Patient Instructions  Complete ordered labs  Schedule for bone density test  We will discuss results at next visit  RTC in 6 weeks

## 2024-12-19 LAB — RHEUMATOID FACTOR: <10 IU/ML

## 2024-12-20 ENCOUNTER — HOSPITAL ENCOUNTER (OUTPATIENT)
Dept: WOMENS IMAGING | Age: 74
Discharge: HOME | End: 2024-12-20
Payer: MEDICARE

## 2024-12-20 DIAGNOSIS — M85.89 OTHER SPECIFIED DISORDERS OF BONE DENSITY AND STRUCTURE, MULTIPLE SITES: ICD-10-CM

## 2024-12-20 DIAGNOSIS — R68.2 DRY MOUTH: ICD-10-CM

## 2024-12-20 LAB
CYCLIC CITRULLIN PEPTIDE AB: 4 UNITS (ref 0–19)
QUANTI TB GOLD PLUS: NEGATIVE
QUANTI TB1 MINUS NIL: 0 IU/ML
QUANTI TB2 MINUS NIL: 0 IU/ML
QUANTIFERON MITOGEN: 10 IU/ML
QUANTIFERON NIL: 0 IU/ML
SJOGREN'S ANTIBODIES (SSA): <0.2 AI (ref 0–0.9)

## 2024-12-20 PROCEDURE — 77080 DXA BONE DENSITY AXIAL: CPT

## 2025-01-10 ENCOUNTER — OFFICE VISIT (OUTPATIENT)
Dept: PULMONOLOGY | Age: 75
End: 2025-01-10
Payer: MEDICARE

## 2025-01-10 VITALS
BODY MASS INDEX: 25.68 KG/M2 | HEIGHT: 70 IN | DIASTOLIC BLOOD PRESSURE: 62 MMHG | SYSTOLIC BLOOD PRESSURE: 132 MMHG | HEART RATE: 78 BPM | WEIGHT: 179.4 LBS | OXYGEN SATURATION: 99 %

## 2025-01-10 DIAGNOSIS — G47.33 OSA (OBSTRUCTIVE SLEEP APNEA): Primary | ICD-10-CM

## 2025-01-10 DIAGNOSIS — G47.10 HYPERSOMNIA: ICD-10-CM

## 2025-01-10 DIAGNOSIS — E66.3 OVERWEIGHT (BMI 25.0-29.9): ICD-10-CM

## 2025-01-10 PROCEDURE — 1036F TOBACCO NON-USER: CPT | Performed by: INTERNAL MEDICINE

## 2025-01-10 PROCEDURE — 99214 OFFICE O/P EST MOD 30 MIN: CPT | Performed by: INTERNAL MEDICINE

## 2025-01-10 PROCEDURE — G8427 DOCREV CUR MEDS BY ELIG CLIN: HCPCS | Performed by: INTERNAL MEDICINE

## 2025-01-10 PROCEDURE — 1159F MED LIST DOCD IN RCRD: CPT | Performed by: INTERNAL MEDICINE

## 2025-01-10 PROCEDURE — G8419 CALC BMI OUT NRM PARAM NOF/U: HCPCS | Performed by: INTERNAL MEDICINE

## 2025-01-10 PROCEDURE — 1123F ACP DISCUSS/DSCN MKR DOCD: CPT | Performed by: INTERNAL MEDICINE

## 2025-01-10 RX ORDER — FLUOROURACIL 50 MG/G
CREAM TOPICAL DAILY
COMMUNITY
Start: 2024-03-12 | End: 2025-03-08

## 2025-01-10 ASSESSMENT — ENCOUNTER SYMPTOMS
EYE ITCHING: 0
SHORTNESS OF BREATH: 0
BACK PAIN: 0
COUGH: 0
ABDOMINAL PAIN: 0
EYE DISCHARGE: 0
ABDOMINAL DISTENTION: 0

## 2025-01-10 NOTE — PROGRESS NOTES
data from the HST  He has no EDS now  Advised to get back to us if he needs any help              Other    Hypersomnia       He had less than 2 hours of data from the HST  He has no EDS now  Advised to get back to us if he needs any help           Overweight (BMI 25.0-29.9)        Loose weight                 Total time spent for this encounter: 35 mins    Follow-Up:    Return if symptoms worsen or fail to improve.     Progress notes sent to the referring Provider    Arjun Cheema MD MD  1/10/2025  9:36 AM

## 2025-01-10 NOTE — ASSESSMENT & PLAN NOTE
He had less than 2 hours of data from the HST  He has no EDS now  Advised to get back to us if he needs any help

## 2025-02-01 NOTE — PROGRESS NOTES
RHEUMATOLOGY FOLLOW UP VISIT    2025      Patient Name: Fidencio Jose  : 1950  Medical Record: [id number]      CHIEF COMPLAINT  Dry mouth  Sjogren Syndrome    Pertinent Problems  GERD    HISTORY OF PRESENT ILLNESS    Fidencio Jose is a 74 y.o. male who established on 24. Symptom onset began with dry mouth ~ 3 years ago. Dry mouth wakes him from sleep and he is extremely thirsty upon waking. He feels well during the day. His eye are also watering.   Cevimeline did not help his dry mouth.       LCV: 24  Vitamin D 28.9L  SSA negative  CCP negative  RF negative  Uric acid normal  LFT normal  DEXA scan is normal    Subjective:   Today he is here to discuss his labs  Recently, there has been no issues with his feet  Hair loss: Denies  Oral Ulcers: Denies, no dental cavities or infection  Dry eyes and mouth: dry mouth in the a.m , watering eye throughout the day   Blood Clots: right LE DVT on eliquis  Seizures/strokes: TIA x3   Anemia/thrombocytopenia/leukopenia: chronic anemia       Denies smoking, etoh, recreational drug use. There is no family hx of sjogrens, lupus. Mother had arthritis      Current rheum meds: none    Past rheum meds:           REVIEW OF SYSTEMS     Constitutional:  Denies fever or chills, decreased appetite, or weight loss   Eyes:  Denies change in visual acuity or eye dryness or irritation  HENT:  Denies dry mouth or oral ulcers  Respiratory:  Denies cough or shortness of breath   Cardiovascular:  Denies chest pain or edema   GI:  Denies abdominal pain, nausea, vomiting, bloody stools or diarrhea   :  Denies dysuria or hematuria  Musculoskeletal:  See HPI  Integument:  Denies rash   Neurologic:  Denies headache, focal weakness or sensory changes   Endocrine:  Denies polyuria or polydipsia   Lymphatic:  Denies swollen glands   Psychiatric:  Denies depression or anxiety       PROBLEM LIST    Patient Active Problem List   Diagnosis    Anemia    Hypercholesteremia    VINCE

## 2025-02-04 ENCOUNTER — OFFICE VISIT (OUTPATIENT)
Age: 75
End: 2025-02-04
Payer: MEDICARE

## 2025-02-04 VITALS
WEIGHT: 179.2 LBS | DIASTOLIC BLOOD PRESSURE: 76 MMHG | OXYGEN SATURATION: 97 % | BODY MASS INDEX: 25.65 KG/M2 | HEART RATE: 60 BPM | SYSTOLIC BLOOD PRESSURE: 132 MMHG | HEIGHT: 70 IN | RESPIRATION RATE: 16 BRPM

## 2025-02-04 DIAGNOSIS — E55.9 VITAMIN D DEFICIENCY: ICD-10-CM

## 2025-02-04 DIAGNOSIS — R68.2 DRY MOUTH: Primary | ICD-10-CM

## 2025-02-04 PROCEDURE — 1036F TOBACCO NON-USER: CPT | Performed by: STUDENT IN AN ORGANIZED HEALTH CARE EDUCATION/TRAINING PROGRAM

## 2025-02-04 PROCEDURE — 99213 OFFICE O/P EST LOW 20 MIN: CPT | Performed by: STUDENT IN AN ORGANIZED HEALTH CARE EDUCATION/TRAINING PROGRAM

## 2025-02-04 PROCEDURE — 1159F MED LIST DOCD IN RCRD: CPT | Performed by: STUDENT IN AN ORGANIZED HEALTH CARE EDUCATION/TRAINING PROGRAM

## 2025-02-04 PROCEDURE — 1126F AMNT PAIN NOTED NONE PRSNT: CPT | Performed by: STUDENT IN AN ORGANIZED HEALTH CARE EDUCATION/TRAINING PROGRAM

## 2025-02-04 PROCEDURE — G8419 CALC BMI OUT NRM PARAM NOF/U: HCPCS | Performed by: STUDENT IN AN ORGANIZED HEALTH CARE EDUCATION/TRAINING PROGRAM

## 2025-02-04 PROCEDURE — G8427 DOCREV CUR MEDS BY ELIG CLIN: HCPCS | Performed by: STUDENT IN AN ORGANIZED HEALTH CARE EDUCATION/TRAINING PROGRAM

## 2025-02-04 PROCEDURE — 1123F ACP DISCUSS/DSCN MKR DOCD: CPT | Performed by: STUDENT IN AN ORGANIZED HEALTH CARE EDUCATION/TRAINING PROGRAM

## 2025-02-04 PROCEDURE — 99214 OFFICE O/P EST MOD 30 MIN: CPT | Performed by: STUDENT IN AN ORGANIZED HEALTH CARE EDUCATION/TRAINING PROGRAM

## (undated) DEVICE — LINE SAMP O2 6.5FT W/FEMALE CONN F/ADULT CAPNOLINE PLUS

## (undated) DEVICE — FORCEPS BX L240CM JAW DIA2.8MM L CAP W/ NDL MIC MESH TOOTH

## (undated) DEVICE — JELLY LUBRICATING 3 GM BACTERIOSTATIC

## (undated) DEVICE — FORCEPS BX 240CM JAW 3.2MM L CAP NDL MIC MESH TTH M00513372

## (undated) DEVICE — TUBING, SUCTION, 3/16" X 6', STRAIGHT: Brand: MEDLINE

## (undated) DEVICE — KENDALL 500 SERIES DIAPHORETIC FOAM MONITORING ELECTRODE - TEAR DROP SHAPE ( 30/PK): Brand: KENDALL

## (undated) DEVICE — LINER SUCT CANSTR 1500CC SEMI RIG W/ POR HYDROPHOBIC SHUT

## (undated) DEVICE — THE TORRENT IRRIGATION SCOPE CONNECTOR IS USED WITH THE TORRENT IRRIGATION TUBING TO PROVIDE IRRIGATION FLUIDS SUCH AS STERILE WATER DURING GASTROINTESTINAL ENDOSCOPIC PROCEDURES WHEN USED IN CONJUNCTION WITH AN IRRIGATION PUMP (OR ELECTROSURGICAL UNIT).: Brand: TORRENT

## (undated) DEVICE — BW-412T DISP COMBO CLEANING BRUSH: Brand: SINGLE USE COMBINATION CLEANING BRUSH

## (undated) DEVICE — SNARE ENDOSCP L240CM SHTH DIA24MM LOOP W10MM POLYP RND REINF